# Patient Record
Sex: FEMALE | Race: WHITE | NOT HISPANIC OR LATINO | URBAN - METROPOLITAN AREA
[De-identification: names, ages, dates, MRNs, and addresses within clinical notes are randomized per-mention and may not be internally consistent; named-entity substitution may affect disease eponyms.]

---

## 2021-01-15 DIAGNOSIS — Z23 NEED FOR VACCINATION: ICD-10-CM

## 2022-06-27 PROBLEM — G56.02 LEFT CARPAL TUNNEL SYNDROME: Status: ACTIVE | Noted: 2022-06-27

## 2022-12-12 ENCOUNTER — HOSPITAL ENCOUNTER (OUTPATIENT)
Facility: MEDICAL CENTER | Age: 72
End: 2022-12-12
Attending: ANESTHESIOLOGY
Payer: MEDICARE

## 2022-12-12 PROCEDURE — U0003 INFECTIOUS AGENT DETECTION BY NUCLEIC ACID (DNA OR RNA); SEVERE ACUTE RESPIRATORY SYNDROME CORONAVIRUS 2 (SARS-COV-2) (CORONAVIRUS DISEASE [COVID-19]), AMPLIFIED PROBE TECHNIQUE, MAKING USE OF HIGH THROUGHPUT TECHNOLOGIES AS DESCRIBED BY CMS-2020-01-R: HCPCS

## 2022-12-12 PROCEDURE — U0005 INFEC AGEN DETEC AMPLI PROBE: HCPCS

## 2022-12-13 LAB
SARS-COV-2 RNA RESP QL NAA+PROBE: NOTDETECTED
SPECIMEN SOURCE: NORMAL

## 2022-12-15 PROBLEM — M79.643 HAND PAIN: Status: ACTIVE | Noted: 2022-12-15

## 2022-12-30 PROBLEM — M65.312 TRIGGER THUMB, LEFT THUMB: Status: ACTIVE | Noted: 2022-12-30

## 2023-10-18 PROBLEM — M65.311 TRIGGER THUMB OF RIGHT HAND: Status: ACTIVE | Noted: 2023-10-18

## 2023-10-18 PROBLEM — M19.031 OSTEOARTHRITIS OF RIGHT WRIST: Status: ACTIVE | Noted: 2023-10-18

## 2023-11-29 ENCOUNTER — PATIENT MESSAGE (OUTPATIENT)
Dept: HEALTH INFORMATION MANAGEMENT | Facility: OTHER | Age: 73
End: 2023-11-29

## 2024-04-21 ENCOUNTER — HOSPITAL ENCOUNTER (EMERGENCY)
Facility: MEDICAL CENTER | Age: 74
End: 2024-04-21
Attending: EMERGENCY MEDICINE
Payer: MEDICARE

## 2024-04-21 VITALS
BODY MASS INDEX: 31.47 KG/M2 | DIASTOLIC BLOOD PRESSURE: 87 MMHG | HEART RATE: 56 BPM | OXYGEN SATURATION: 96 % | WEIGHT: 160.27 LBS | TEMPERATURE: 97 F | RESPIRATION RATE: 19 BRPM | HEIGHT: 60 IN | SYSTOLIC BLOOD PRESSURE: 148 MMHG

## 2024-04-21 DIAGNOSIS — S05.01XA ABRASION OF RIGHT CORNEA, INITIAL ENCOUNTER: ICD-10-CM

## 2024-04-21 PROCEDURE — 700101 HCHG RX REV CODE 250: Performed by: EMERGENCY MEDICINE

## 2024-04-21 PROCEDURE — 99284 EMERGENCY DEPT VISIT MOD MDM: CPT

## 2024-04-21 RX ORDER — PROPARACAINE HYDROCHLORIDE 5 MG/ML
1 SOLUTION/ DROPS OPHTHALMIC ONCE
Status: COMPLETED | OUTPATIENT
Start: 2024-04-21 | End: 2024-04-21

## 2024-04-21 RX ADMIN — FLUORESCEIN SODIUM 1 MG: 1 STRIP OPHTHALMIC at 06:51

## 2024-04-21 RX ADMIN — PROPARACAINE HYDROCHLORIDE 1 DROP: 5 SOLUTION/ DROPS OPHTHALMIC at 06:51

## 2024-04-21 NOTE — ED NOTES
Pt. Verbalizes understanding of discharge instructions. accompanied to lobby with spouse. Pt. Alert/awake in NAD.  All questions answered and understood.

## 2024-04-21 NOTE — ED TRIAGE NOTES
Pt is conscious, alert and oriented. PT has a patent airway and no signs of resp. Distress. Pt complains of right eye irritation. Pt states it started bothering her last night, but this morning it is painful and feels like there is a foreign body.

## 2024-04-21 NOTE — ED PROVIDER NOTES
ED Provider Note    CHIEF COMPLAINT  Chief Complaint   Patient presents with    Eye Pain     Pt states that when she woke up from bed she noticed something on her eye. They tried to flash it with water and doesn't work. Pt has hx cataract surgery on both eyes and hysterectomy.       EXTERNAL RECORDS REVIEWED  Other no clinically significant findings on chart review with relation to this patient's complaint today    HPI/ROS  LIMITATION TO HISTORY   Select: : None  OUTSIDE HISTORIAN(S):  None    Radha Hunter is a 74 y.o. female who presents to the ER with right eye irritation.  Patient explains that yesterday she was doing some work in a shed where she believes she may have had some small particulate into her eye or hair.  Through the evening she was having some persistent eye discomfort which was worse this morning ultimately bring her here.  Does note remote history of cataract surgeries bilaterally.  Otherwise does not wear corrective lenses or glasses.  No significant change in vision but again does have some burning discomfort to her eye.    PAST MEDICAL HISTORY       SURGICAL HISTORY   has a past surgical history that includes wrist arthroscop,release xvers lig (Left, 12/15/2022) and pb incise finger tendon sheath (Left, 12/15/2022).    FAMILY HISTORY  History reviewed. No pertinent family history.    SOCIAL HISTORY  Social History     Tobacco Use    Smoking status: Never    Smokeless tobacco: Never   Vaping Use    Vaping Use: Never used   Substance and Sexual Activity    Alcohol use: Yes     Alcohol/week: 0.6 oz     Types: 1 Glasses of wine per week    Drug use: Never    Sexual activity: Not on file       CURRENT MEDICATIONS  Home Medications    **Home medications have not yet been reviewed for this encounter**         ALLERGIES  No Known Allergies    PHYSICAL EXAM  VITAL SIGNS: BP (!) 148/87   Pulse (!) 56   Temp 36.1 °C (97 °F) (Temporal)   Resp 19   Ht 1.524 m (5')   Wt 72.7 kg (160 lb 4.4 oz)   SpO2  "96%   BMI 31.30 kg/m²        Pulse ox interpretation: I interpret this pulse ox as normal.  Constitutional: Alert in no apparent distress.  HENT: No signs of trauma, Bilateral external ears normal, Nose normal.     Eyes:  Right eye: Postsurgical pupillary defect noted.  No significant conjunctival injection.  No large foreign body or abnormalities noted on initial exam.  Additional eye exam completed with proparacaine and fluorescein.  Slit lamp utilized for this exam which was normal.  No anterior chamber cell, flare, hyphema or hypopyon.  No obvious foreign body.  Lids flipped and swept with no foreign body.  After fluorescein was applied there is some slight dye uptake of the diffusely along the lower half of the cornea.  No \"ice gait rink sign \".  No Sidel sign.    Thorax & Lungs:No respiratory distress  Skin: Warm, Dry, No erythema, No rash.   Musculoskeletal: Good range of motion in all major joints.   Neurologic: Alert , Normal motor function, Normal sensory function, No focal deficits noted.   Psychiatric: Affect normal, Judgment normal, Mood normal.         COURSE & MEDICAL DECISION MAKING    ASSESSMENT, COURSE AND PLAN  Care Narrative: 74-year-old female presenting with eye irritation please see above exam and procedure for eye exam.    DISPOSITION AND DISCUSSIONS  I have discussed management of the patient with the following physicians and JUDAH's:   None    Discussion of management with other [facility] or appropriate source(s): None     74-year-old female presenting with above presentation.  Eye exam is largely benign.  Slight corneal abrasion noted.  Patient understanding of ongoing home eye care and she will follow-up with her regular eye physician.  Will return here to the ER with any changes or worsening.  Patient wishes to defer from ophth. antibiotics at this time.    FINAL DIAGNOSIS  1. Abrasion of right cornea, initial encounter           Electronically signed by: José Antonio Dai M.D., 4/21/2024 7:04 " AM

## 2024-04-21 NOTE — ED TRIAGE NOTES
.  Chief Complaint   Patient presents with    Eye Pain     Pt states that when she woke up from bed she noticed something on her eye. They tried to flash it with water and doesn't work.     .BP (!) 152/102   Pulse (!) 55   Temp 36.1 °C (96.9 °F) (Temporal)   Resp 17   Ht 1.524 m (5')   Wt 72.7 kg (160 lb 4.4 oz)   SpO2 99%   BMI 31.30 kg/m²